# Patient Record
Sex: FEMALE | Race: WHITE
[De-identification: names, ages, dates, MRNs, and addresses within clinical notes are randomized per-mention and may not be internally consistent; named-entity substitution may affect disease eponyms.]

---

## 2023-08-03 ENCOUNTER — HOSPITAL ENCOUNTER (OUTPATIENT)
Dept: HOSPITAL 47 - RADMAMWWP | Age: 43
Discharge: HOME | End: 2023-08-03
Attending: OBSTETRICS & GYNECOLOGY
Payer: COMMERCIAL

## 2023-08-03 DIAGNOSIS — Z78.0: ICD-10-CM

## 2023-08-03 DIAGNOSIS — Z80.3: ICD-10-CM

## 2023-08-03 DIAGNOSIS — Z12.31: Primary | ICD-10-CM

## 2023-08-03 PROCEDURE — 77067 SCR MAMMO BI INCL CAD: CPT

## 2023-08-03 PROCEDURE — 77063 BREAST TOMOSYNTHESIS BI: CPT

## 2023-08-08 NOTE — MM
Reason for Exam: Screening  (asymptomatic). 

Last mammogram was performed 1 year(s) and 9 month(s) ago. 





Patient History: 

Menarche at age 15. First Full-Term Pregnancy at age 37. Late child-bearing (after 30).

Perimenopausal. Currently using Progesterone, starting at age 41.

Maternal aunt had breast cancer, age 45. Maternal grandmother had breast cancer, age 70. Paternal

grandmother had breast cancer, age 73. 





Risk Values: 

Lizz 5 year model risk: 0.8%.

NCI Lifetime model risk: 12.2%.





Prior Study Comparison: 

10/28/2021 Bilateral Screening Mammogram, [facility]. 11/3/2021 Bilateral Diagnostic

Mammogram, [facility]. 





Tissue Density: 

There are scattered fibroglandular densities.





Findings: 

Analyzed By CAD. 

There is no suspicious group of microcalcifications or new suspicious mass in either breast. 





Overall Assessment: Negative, BI-RAD 1





Management: 

Screening Mammogram of both breasts in 1 year.

[facility] will attempt to contact patient to return for supplemental views and

ultrasound if indicated.



Patient should continue monthly self-breast exams.  A clinical breast exam by your physician is

recommended on an annual basis.

This exam should not preclude additional follow-up of suspicious palpable abnormalities.



Note on Lizz scores and lifetime risk:

1. A Lizz score greater than 3% is considered moderate risk. If this is the case, consider

specialist referral to assess eligibility for a risk reducing agent.

2. If overall lifetime risk for the development of breast cancer is 20% or higher, the patient may

qualify for future screening with alternating mammogram and breast MRI.



Electronically signed and approved by: Jon Grover DO

## 2024-08-29 NOTE — MM
Reason for Exam: Screening  (asymptomatic). 

Last screening mammogram was performed 12 month(s) ago.





Patient History: 

Menarche at age 15. First Full-Term Pregnancy at age 37. Late child-bearing (after 30).

Perimenopausal. Currently using Progesterone, starting at age 41.

Maternal aunt had breast cancer, age 45. Maternal grandmother had breast cancer, age 70. Paternal

grandmother had breast cancer, age 73. 





Risk Values: 

Lizz 5 year model risk: 0.9%.

NCI Lifetime model risk: 12.1%.





Prior Study Comparison: 

10/28/2021 Bilateral Screening Mammogram, [facility]. 11/3/2021 Bilateral Diagnostic

Mammogram, [facility]. 8/3/2023 Bilateral MG 3D screening mammo w/cad, [facility]. 





Tissue Density: 

The breasts are almost entirely fatty.





Findings: 

Analyzed By CAD. 

Right breast: There is no suspicious group of microcalcifications or new suspicious mass.



Left breast: There is no suspicious group of microcalcifications or new suspicious mass. 





Overall Assessment: Negative, BI-RAD 1





Management: 

Screening Mammogram of both breasts in 1 year.

[facility] will attempt to contact patient to return for supplemental views and

ultrasound if indicated.



Patient should continue monthly self-breast exams.  A clinical breast exam by your physician is

recommended on an annual basis.

This exam should not preclude additional follow-up of suspicious palpable abnormalities.



Note on Lizz scores and lifetime risk:

1. A Lizz score greater than 3% is considered moderate risk. If this is the case, consider

specialist referral to assess eligibility for a risk reducing agent.

2. If overall lifetime risk for the development of breast cancer is 20% or higher, the patient may

qualify for future screening with alternating mammogram and breast MRI.



Electronically signed and approved by: Jon Grover DO